# Patient Record
Sex: FEMALE | Race: WHITE
[De-identification: names, ages, dates, MRNs, and addresses within clinical notes are randomized per-mention and may not be internally consistent; named-entity substitution may affect disease eponyms.]

---

## 2020-10-12 ENCOUNTER — HOSPITAL ENCOUNTER (EMERGENCY)
Dept: HOSPITAL 7 - FB.ED | Age: 65
Discharge: NURSING, REHAB OR LONG-TERM CARE | End: 2020-10-12
Payer: COMMERCIAL

## 2020-10-12 DIAGNOSIS — R91.8: ICD-10-CM

## 2020-10-12 DIAGNOSIS — J18.9: Primary | ICD-10-CM

## 2020-10-12 DIAGNOSIS — E88.09: ICD-10-CM

## 2020-10-12 DIAGNOSIS — I95.9: ICD-10-CM

## 2020-10-12 DIAGNOSIS — R60.1: ICD-10-CM

## 2020-10-12 DIAGNOSIS — D64.9: ICD-10-CM

## 2020-10-12 DIAGNOSIS — Z20.828: ICD-10-CM

## 2020-10-12 LAB
BASE EXCESS BLDV CALC-SCNC: -7 MMOL/L (ref -2–3)
HCO3 BLDV-SCNC: 18 MMOL/L (ref 21–29)
PCO2 BLDV: 33 MMHG (ref 41–51)
PH BLDV: 7.35 PH UNITS (ref 7.32–7.43)

## 2020-10-12 PROCEDURE — U0002 COVID-19 LAB TEST NON-CDC: HCPCS

## 2020-10-12 PROCEDURE — P9016 RBC LEUKOCYTES REDUCED: HCPCS

## 2020-10-12 NOTE — EDM.PDOC
ED HPI GENERAL MEDICAL PROBLEM





- General


Stated Complaint: DEHYDRATION


Time Seen by Provider: 10/12/20 15:05


Source of Information: Reports: Patient


History Limitations: Reports: No Limitations





- History of Present Illness


INITIAL COMMENTS - FREE TEXT/NARRATIVE: 





65-year-old female who reports that she was diagnosed with Lyme disease and the 

latter part of 2016 for the early part of 2017. She reports that it was a 

delayed diagnosis and she was not treated with any antibiotics for this. She 

instead sought help through a naturopathic physician and she reports that she 

has been being treated with natural supplements for the past 3 years. She 

reports that her symptoms during this time were varied. She reports that she had

migratory arthralgias, fatigue, abdominal bloating with diarrhea at times and 

generalized malaise. She reports that these symptoms seem to come and go and 

they never really seemed to have improved. Beginning in July of this year she 

reports that she began to have recent difficulty with doing her activities of 

daily living and her ability to do these things have progressively declined over

the past 3 months. She states beginning about 6 weeks ago she began to have 

increasing fatigue and difficulty ambulating without assistance. Beginning about

4-5 weeks ago she started having swelling in her legs with tingling sores on her

legs and this has progressively worsened with time. Over the past 2 weeks she 

has found that it is difficult to even stand up or move and she gets short of 

breath with any of these activities and gets very tired. She continues to have 

migratory arthralgias and intermittent intestinal symptoms of bloating and 

diarrhea. Over the past week she has not been able to get out of bed and she has

felt extremely weak with near syncope at times and the feeling of shortness of 

breath with any activity. She reports the sores on her legs are continually 

weeping and she has skin breakdown in her perineum and sacral area. She has had 

no nausea or vomiting. She has been eating and drinking normally. She currently 

has 2/10 level of pain and it is currently in her left shoulder but it had been 

in her right hip earlier today. She also has bilateral lower leg pain that is an

aching and burning type pain. She has had no fevers or chills. No nasal 

congestion. No trouble swallowing. She has basically been in her house since 

March 2020 and she has had no exposure to anyone with coronavirus-type symptoms.

She has had no cough. No localized weakness or numbness. Over the past week she 

has had formed bowel movements and she has had no note of blood in her stool. 

There was no noted blood in her stool or black stools prior to this as well. 

There are no other associated signs or symptoms. There are no other modifying 

factors.


Onset: Other (Ongoing since July 2020 with more rapid progression over the past 

1-2 weeks.)


Duration: Getting Worse


Location: Reports: Generalized


Quality: Reports: Ache, Sharp


Severity: Mild (to veer at times)


Improves with: Reports: None


Worsens with: Reports: Other (Activity), Movement


Associated Symptoms: Reports: No Other Symptoms (Except as above.)


Treatments PTA: Reports: Other (see below) (Nothing.)





- Related Data


                                    Allergies











Allergy/AdvReac Type Severity Reaction Status Date / Time


 


No Known Allergies Allergy   Verified 10/12/20 15:36














Past Medical History





- Past Health History


Medical/Surgical History: Denies Medical/Surgical History (No chronic medical 

problems other than the report of Lyme disease as above. Surgical history as 

detailed below.)





- Past Surgical History


HEENT Surgical History: Reports: Tonsillectomy





Social & Family History





- Tobacco Use


Smoking Status *Q: Never Smoker





- Alcohol Use


Alcohol Use History: No





- Living Situation & Occupation


Living situation: Reports: , with Spouse


Occupation: Retired





ED ROS GENERAL





- Review of Systems


Review Of Systems: See Below


Constitutional: Reports: Weakness, Fatigue, Decreased Appetite, Weight Loss (30 

pound weight loss over the past 2 months.)


HEENT: Reports: Other (Dry mouth.)


Respiratory: Reports: Shortness of Breath.  Denies: Cough, Sputum, Hemoptysis


Cardiovascular: Reports: Lightheadedness


GI/Abdominal: Reports: Decreased Appetite


: Reports: No Symptoms (Except decreased urine output)


Musculoskeletal: Reports: Other (Migratory body aches and arthralgias. Bilateral

leg edema. Bilateral arm edema.)


Skin: Reports: Pallor, Wound (Wounds over both lower legs and also scl dperineal

skin reakdown.)


Neurological: Reports: Weakness (Generalized weakness)


Hematologic/Lymphatic: Reports: No Symptoms


Immunologic: Reports: No Symptoms





ED EXAM, NEURO





- Physical Exam


Exam: See Below


Exam Limited By: No Limitations


General Appearance: Alert, Moderate Distress (Does appear to have some increased

work of breathing.), Thin, Other (Appear somewhat ill.)


Eye Exam: Bilateral Eye: EOMI, Normal Inspection (Sclera are anicteric)


Ears: Normal External Exam, Hearing Grossly Normal


Nose: Normal Inspection, Normal Mucosa, No Blood


Throat/Mouth: Normal Voice, No Airway Compromise, Other (Dry mucous membranes)


Head Exam: Atraumatic, Normocephalic


Neck: Normal Inspection, Supple, Non-Tender, Full Range of Motion


Respiratory/Chest: Lungs Clear, Normal Breath Sounds, Accessory Muscle Use 

(Slight increased work of breathing.).  No: Crackles, Rales, Wheezing


Cardiovascular: Normal Peripheral Pulses, Regular Rate, Rhythm, Systolic Murmur 

(Slight, 1/6)


GI/Abdominal: Normal Bowel Sounds, Soft, Non-Tender, No Mass, Other (Scaphoid)


Neurological: Alert, Normal Dorsiflexion, CN II-XII Intact, Normal Plantar 

Flexion, No Motor/Sensory Deficits, Oriented x 3


Back Exam: Normal Inspection, Full Range of Motion


Extremities: Normal Capillary Refill, Pedal Edema, Other (Cellulitis noted)


Psychiatric: Depressed Mood


Skin Exam: Warm, Pallor, Wound/Incision (Multiple wounds on both lower legs that

are weeping. Skin breakdown in the sacral and perineal area with erythema.)





EKG INTERPRETATION


EKG Date: 10/12/20


Time: 15:00


Rhythm: NSR


Rate (Beats/Min): 78


Axis: Normal


P-Wave: Present


QRS: Normal


ST-T: Normal


QT: Normal


Comparison: NA - No Prior EKG





Course





- Vital Signs


Last Recorded V/S: 


                                Last Vital Signs











Temp  35.8 C L  10/12/20 19:32


 


Pulse  84   10/12/20 19:32


 


Resp  16   10/12/20 19:32


 


BP  83/37 L  10/12/20 19:32


 


Pulse Ox  82 L  10/12/20 14:36














- Orders/Labs/Meds


Orders: 


                               Active Orders 24 hr











 Category Date Time Status


 


 EKG Documentation Completion [RC] ASDIRECTED Care  10/12/20 15:38 Active


 


 Insert Urinary Catheter [OM.PC] Q24H Care  10/12/20 15:45 Ordered


 


 Urinary Catheter Assessment [RC] QSHIFT Care  10/12/20 15:38 Active


 


 CULTURE BLOOD [BC] Urgent Lab  10/12/20 15:40 Results


 


 CULTURE BLOOD [BC] Urgent Lab  10/12/20 16:15 Results


 


 CULTURE URINE [RM] Stat Lab  10/12/20 15:36 Ordered


 


 UA W/MICROSCOPIC [URIN] Stat Lab  10/12/20 15:36 Ordered


 


 Sodium Chloride 0.9% [Normal Saline] 1,000 ml Med  10/12/20 15:45 Active





 IV ASDIRECTED   


 


 Sodium Chloride 0.9% [Saline Flush] Med  10/12/20 15:36 Active





 10 ml FLUSH ASDIRECTED PRN   


 


 Blood Culture x2 Reflex Set [OM.PC] Urgent Oth  10/12/20 15:36 Ordered


 


 Peripheral IV Insertion Adult [OM.PC] Routine Oth  10/12/20 15:36 Ordered


 


 EKG 12 Lead [EK] Routine Ther  10/12/20 15:36 Ordered








                                Medication Orders





Sodium Chloride (Normal Saline)  1,000 mls @ 100 mls/hr IV ASDIRECTED GALINDO


   Last Infusion: 10/12/20 19:06  Dose: 100 mls/hr


   Documented by: KOFI


   Infusion: 10/12/20 16:30  Dose: 999 mls/hr


   Documented by: KOFI


   Admin: 10/12/20 16:24  Dose: 100 mls/hr


   Documented by: JAMEY


Sodium Chloride (Saline Flush)  10 ml FLUSH ASDIRECTED PRN


   PRN Reason: Keep Vein Open








Labs: 


                                Laboratory Tests











  10/12/20 10/12/20 10/12/20 Range/Units





  15:36 15:40 16:15 


 


WBC    32.9 H*  (4.5-12.0)  X10-3/uL


 


RBC    3.00 L  (3.23-5.20)  x10(6)uL


 


Hgb    4.8 L*  (11.5-15.5)  g/dL


 


Hct    16.9 L* D  (30.0-51.3)  %


 


MCV    56.4 L  (80-96)  fL


 


MCH    15.9 L  (27.7-33.6)  pg


 


MCHC    28.3 L  (32.2-35.4)  g/dL


 


RDW    23.7 H  (11.5-15.5)  %


 


Plt Count    361  (125-369)  X10(3)uL


 


MPV    7.0 L  (7.4-10.4)  fL


 


Add Manual Diff    Yes  


 


Neutrophils % (Manual)    94 H  (46-82)  %


 


Band Neutrophils %    2  (0-6)  %


 


Lymphocytes % (Manual)    3 L  (13-37)  %


 


Monocytes % (Manual)    2 L  (4-12)  %


 


Toxic Granulation    Moderate H  (NOT SEEN)  


 


Hypochromasia    Marked H  


 


Anisocytosis    Moderate H  


 


Microcytosis    Moderate H  


 


Tear Drop Cells    Few  


 


PT     (9.0-11.1)  sec


 


INR     (1.00-1.24)  


 


POC VBG pH   7.35   (7.32-7.43)  pH Units


 


POC VBG pCO2   33 L   (41-51)  mmHg


 


POC VBG HCO3   18 L   (21-29)  mmol/L


 


VBG Base Excess   -7 L   (-2-3)  mmol/L


 


O2 Delivery Device   Room air   


 


Sodium  128 L    (135-145)  mmol/L


 


Potassium  4.0    (3.5-5.3)  mmol/L


 


Chloride  97 L    (100-110)  mmol/L


 


Carbon Dioxide  18 L    (21-32)  mmol/L


 


BUN  48 H    (7-18)  mg/dL


 


Creatinine  1.4 H    (0.55-1.02)  mg/dL


 


Est Cr Clr Drug Dosing  TNP    


 


Estimated GFR (MDRD)  38 L    (>60)  


 


BUN/Creatinine Ratio  34.3 H    (9-20)  


 


Glucose  100    ()  mg/dL


 


Lactic Acid     (0.4-2.0)  mmol/L


 


Calcium  8.1 L    (8.6-10.2)  mg/dL


 


Magnesium  1.7 L    (1.8-2.5)  mg/dL


 


Total Bilirubin  0.3    (0.1-1.3)  mg/dL


 


AST  34 H    (5-25)  IU/L


 


ALT  26    (12-36)  U/L


 


Alkaline Phosphatase  170 H    ()  IU/L


 


Troponin I     (4.0-60.3)  pg/mL


 


C-Reactive Protein     (0.5-0.9)  mg/dL


 


NT-Pro-B Natriuret Pep     (<=125)  pg/mL


 


Total Protein  5.7 L    (6.0-8.0)  g/dL


 


Albumin  1.3 L*    (3.2-4.6)  g/dL


 


Globulin  4.4    g/dL


 


Albumin/Globulin Ratio  0.3    


 


TSH, Ultra Sensitive     (0.36-3.74)  IU/mL


 


SARS-CoV-2 RNA (VANCE)     (NEGATIVE)  


 


Blood Type     


 


Gel Antibody Screen     


 


Crossmatch     














  10/12/20 10/12/20 10/12/20 Range/Units





  16:15 16:15 16:15 


 


WBC     (4.5-12.0)  X10-3/uL


 


RBC     (3.23-5.20)  x10(6)uL


 


Hgb     (11.5-15.5)  g/dL


 


Hct     (30.0-51.3)  %


 


MCV     (80-96)  fL


 


MCH     (27.7-33.6)  pg


 


MCHC     (32.2-35.4)  g/dL


 


RDW     (11.5-15.5)  %


 


Plt Count     (125-369)  X10(3)uL


 


MPV     (7.4-10.4)  fL


 


Add Manual Diff     


 


Neutrophils % (Manual)     (46-82)  %


 


Band Neutrophils %     (0-6)  %


 


Lymphocytes % (Manual)     (13-37)  %


 


Monocytes % (Manual)     (4-12)  %


 


Toxic Granulation     (NOT SEEN)  


 


Hypochromasia     


 


Anisocytosis     


 


Microcytosis     


 


Tear Drop Cells     


 


PT    11.6 H  (9.0-11.1)  sec


 


INR    1.08  (1.00-1.24)  


 


POC VBG pH     (7.32-7.43)  pH Units


 


POC VBG pCO2     (41-51)  mmHg


 


POC VBG HCO3     (21-29)  mmol/L


 


VBG Base Excess     (-2-3)  mmol/L


 


O2 Delivery Device     


 


Sodium     (135-145)  mmol/L


 


Potassium     (3.5-5.3)  mmol/L


 


Chloride     (100-110)  mmol/L


 


Carbon Dioxide     (21-32)  mmol/L


 


BUN     (7-18)  mg/dL


 


Creatinine     (0.55-1.02)  mg/dL


 


Est Cr Clr Drug Dosing     


 


Estimated GFR (MDRD)     (>60)  


 


BUN/Creatinine Ratio     (9-20)  


 


Glucose     ()  mg/dL


 


Lactic Acid   1.7   (0.4-2.0)  mmol/L


 


Calcium     (8.6-10.2)  mg/dL


 


Magnesium     (1.8-2.5)  mg/dL


 


Total Bilirubin     (0.1-1.3)  mg/dL


 


AST     (5-25)  IU/L


 


ALT     (12-36)  U/L


 


Alkaline Phosphatase     ()  IU/L


 


Troponin I  114.9 H*    (4.0-60.3)  pg/mL


 


C-Reactive Protein  21.2 H*    (0.5-0.9)  mg/dL


 


NT-Pro-B Natriuret Pep  2903 H*    (<=125)  pg/mL


 


Total Protein     (6.0-8.0)  g/dL


 


Albumin     (3.2-4.6)  g/dL


 


Globulin     g/dL


 


Albumin/Globulin Ratio     


 


TSH, Ultra Sensitive     (0.36-3.74)  IU/mL


 


SARS-CoV-2 RNA (VANCE)     (NEGATIVE)  


 


Blood Type     


 


Gel Antibody Screen     


 


Crossmatch     














  10/12/20 10/12/20 10/12/20 Range/Units





  16:15 16:15 18:50 


 


WBC     (4.5-12.0)  X10-3/uL


 


RBC     (3.23-5.20)  x10(6)uL


 


Hgb     (11.5-15.5)  g/dL


 


Hct     (30.0-51.3)  %


 


MCV     (80-96)  fL


 


MCH     (27.7-33.6)  pg


 


MCHC     (32.2-35.4)  g/dL


 


RDW     (11.5-15.5)  %


 


Plt Count     (125-369)  X10(3)uL


 


MPV     (7.4-10.4)  fL


 


Add Manual Diff     


 


Neutrophils % (Manual)     (46-82)  %


 


Band Neutrophils %     (0-6)  %


 


Lymphocytes % (Manual)     (13-37)  %


 


Monocytes % (Manual)     (4-12)  %


 


Toxic Granulation     (NOT SEEN)  


 


Hypochromasia     


 


Anisocytosis     


 


Microcytosis     


 


Tear Drop Cells     


 


PT     (9.0-11.1)  sec


 


INR     (1.00-1.24)  


 


POC VBG pH     (7.32-7.43)  pH Units


 


POC VBG pCO2     (41-51)  mmHg


 


POC VBG HCO3     (21-29)  mmol/L


 


VBG Base Excess     (-2-3)  mmol/L


 


O2 Delivery Device     


 


Sodium     (135-145)  mmol/L


 


Potassium     (3.5-5.3)  mmol/L


 


Chloride     (100-110)  mmol/L


 


Carbon Dioxide     (21-32)  mmol/L


 


BUN     (7-18)  mg/dL


 


Creatinine     (0.55-1.02)  mg/dL


 


Est Cr Clr Drug Dosing     


 


Estimated GFR (MDRD)     (>60)  


 


BUN/Creatinine Ratio     (9-20)  


 


Glucose     ()  mg/dL


 


Lactic Acid     (0.4-2.0)  mmol/L


 


Calcium     (8.6-10.2)  mg/dL


 


Magnesium     (1.8-2.5)  mg/dL


 


Total Bilirubin     (0.1-1.3)  mg/dL


 


AST     (5-25)  IU/L


 


ALT     (12-36)  U/L


 


Alkaline Phosphatase     ()  IU/L


 


Troponin I     (4.0-60.3)  pg/mL


 


C-Reactive Protein     (0.5-0.9)  mg/dL


 


NT-Pro-B Natriuret Pep     (<=125)  pg/mL


 


Total Protein     (6.0-8.0)  g/dL


 


Albumin     (3.2-4.6)  g/dL


 


Globulin     g/dL


 


Albumin/Globulin Ratio     


 


TSH, Ultra Sensitive  8.52 H*    (0.36-3.74)  IU/mL


 


SARS-CoV-2 RNA (VANCE)    Negative  (NEGATIVE)  


 


Blood Type   A POSITIVE   


 


Gel Antibody Screen   Negative   


 


Crossmatch   See Detail   











Meds: 


Medications











Generic Name Dose Route Start Last Admin





  Trade Name Freq  PRN Reason Stop Dose Admin


 


Sodium Chloride  1,000 mls @ 100 mls/hr  10/12/20 15:45  10/12/20 19:06





  Normal Saline  IV   Infused





  ASDIRECTED GALINDO   Infusion


 


Sodium Chloride  10 ml  10/12/20 15:36 





  Saline Flush  FLUSH  





  ASDIRECTED PRN  





  Keep Vein Open  














Discontinued Medications














Generic Name Dose Route Start Last Admin





  Trade Name Freq  PRN Reason Stop Dose Admin


 


Acetaminophen  1,000 mg  10/12/20 17:09  10/12/20 17:45





  Tylenol Extra Strength  PO  10/12/20 17:10  1,000 mg





  ONETIME ONE   Administration


 


Ceftriaxone Sodium  1 gm  10/12/20 17:39  10/12/20 19:00





  Rocephin  IVPUSH  10/12/20 17:40  1 gm





  ONETIME ONE   Administration


 


Sodium Chloride  500 mls @ 999 mls/hr  10/12/20 15:41  10/12/20 15:50





  Normal Saline  IV  10/12/20 16:11  999 mls/hr





  .BOLUS ONE   Administration


 


Sodium Chloride  500 mls @ 999 mls/hr  10/12/20 18:23 





  Normal Saline  IV  10/12/20 18:53 





  .BOLUS ONE  


 


Morphine Sulfate  2 mg  10/12/20 17:37  10/12/20 18:59





  Morphine  IVPUSH  10/12/20 17:38  2 mg





  ONETIME ONE   Administration


 


Ondansetron HCl  4 mg  10/12/20 17:37  10/12/20 19:00





  Zofran  IVPUSH  10/12/20 17:38  4 mg





  ONETIME ONE   Administration














- Radiology Interpretation


Free Text/Narrative:: 





Portable chest x-ray showed evidence of right upper lobe consolidation but there

 was a masslike structure in the right upper hilar area.





- Re-Assessments/Exams


Free Text/Narrative Re-Assessment/Exam: 





10/12/20 17:30: The patient has remained stable while in the emergency 

department with blood pressures in the 80-90 systolic range. Her respiratory 

status has remained stable as well. She has not been able provide us with a 

urine and she will not allow us to place a Mast catheter. Her blood tests arm 

markedly abnormal with a severe anemia (a hemoglobin of 4.8) and a white blood 

cell count 33,000. Her stool for Hemoccult was negative. She also has evidence 

of dehydration. She has a mild elevation in her troponin and an elevation in her

 proBNP. She also has an elevation in her TSH and she has an albumin of 1.3. 

Chest x-ray shows evidence of pneumonia but there is a masslike area that I felt

 was concerning for malignancy. She has begun to develop bilateral leg pain that

 is becoming quite severe. She is rating it as a 10/10. Her blood pressure has 

been low and give her pain medication but will be cautious with this. The 

patient has evidence of dehydration but also evidence of anasarca as well. S

ystems hypertension and severe anemia. She will need multiple specialty services

 to take part in her care that are not available at [facility]. She 

will also need a higher level of monitoring than is available at [facility]. I will discuss this with the patient.





10/12/20 17:45: I discussed all of the laboratory findings and x-ray findings 

with the patient. I answered her questions. To the questions of her  as 

well. They understand that she will need to be transferred to a higher level of 

care and they have instructed me to discuss the case with the doctors at [facility]

 in [place].





10/12/20 17:55: I have discussed the patient's case with Dr. Vigil, hospitalist 

at [facility] in [place], and he will accept the patient in transfer. Blood has been 

typed and screened and they are working on cross-matching units of PRBCs and if 

one is available prior to transfer we will begin transfusing 1 unit.





10/12/20 19:45: EMS is here to transport the patient. A rapid Covid has been 

ordered and came back negative. The patient has received 2 mg of morphine IV and

 her blood pressure A little bit to the 70-80 range but has stabilized. She is 

getting 1 unit of packed red blood cells now. The patient will be transferred to

 [facility] via ALS ambulance for direct admission soon.








Departure





- Departure


Time of Disposition: 19:59


Disposition: DC/Tfer to [facility]


Condition: Critical (Guarded)


Clinical Impression: 


 Mass of right lung, Anasarca, Severe anemia, Hypoalbuminemia





Right upper lobe pneumonia


Qualifiers:


 Pneumonia type: due to unspecified organism Qualified Code(s): J18.9 - 

Pneumonia, unspecified organism





Hypotension


Qualifiers:


 Hypotension type: unspecified hypotension type Qualified Code(s): I95.9 - 

Hypotension, unspecified








- Discharge Information


Referrals: 


PCP,None [Ordering Only Provider] - 





Sepsis Event Note (ED)





- Focused Exam


Vital Signs: 


                                   Vital Signs











  Temp Temp Pulse Resp BP Pulse Ox


 


 10/12/20 19:32  35.8 C L   84  16  83/37 L 


 


 10/12/20 19:17  35.8 C L   84  16  101/50 L 


 


 10/12/20 14:36   35.9 C L  77  16  92/56 L  82 L














- My Orders


Last 24 Hours: 


My Active Orders





10/12/20 15:36


CULTURE URINE [RM] Stat 


UA W/MICROSCOPIC [URIN] Stat 


Sodium Chloride 0.9% [Saline Flush]   10 ml FLUSH ASDIRECTED PRN 


Blood Culture x2 Reflex Set [OM.PC] Urgent 


Peripheral IV Insertion Adult [OM.PC] Routine 


EKG 12 Lead [EK] Routine 





10/12/20 15:38


EKG Documentation Completion [RC] ASDIRECTED 


Urinary Catheter Assessment [RC] QSHIFT 





10/12/20 15:40


CULTURE BLOOD [BC] Urgent 





10/12/20 15:45


Insert Urinary Catheter [OM.PC] Q24H 


Sodium Chloride 0.9% [Normal Saline] 1,000 ml IV ASDIRECTED 





10/12/20 16:15


CULTURE BLOOD [BC] Urgent 














- Assessment/Plan


Last 24 Hours: 


My Active Orders





10/12/20 15:36


CULTURE URINE [RM] Stat 


UA W/MICROSCOPIC [URIN] Stat 


Sodium Chloride 0.9% [Saline Flush]   10 ml FLUSH ASDIRECTED PRN 


Blood Culture x2 Reflex Set [OM.PC] Urgent 


Peripheral IV Insertion Adult [OM.PC] Routine 


EKG 12 Lead [EK] Routine 





10/12/20 15:38


EKG Documentation Completion [RC] ASDIRECTED 


Urinary Catheter Assessment [RC] QSHIFT 





10/12/20 15:40


CULTURE BLOOD [BC] Urgent 





10/12/20 15:45


Insert Urinary Catheter [OM.PC] Q24H 


Sodium Chloride 0.9% [Normal Saline] 1,000 ml IV ASDIRECTED 





10/12/20 16:15


CULTURE BLOOD [BC] Urgent

## 2020-10-12 NOTE — CR
INDICATION:  Short of breath.  



CHEST ONE VIEW:  An AP upright view of the chest was obtained 10/12/20 - no 
comparisons.  



The heart did not appear grossly enlarged.  



Calcifications noted in the arch of the aorta.  



Overlying EKG leads are noted.  



Dense infiltration is noted in the right midlung field which appears to be in 
the right upper lobe with minimal pleuritis suggested at the minor fissure.  
Findings are most likely on the basis of pneumonia and pleuritis in that area 
with consolidation.  



No other acute process was suggested.  



MTDD